# Patient Record
Sex: MALE | Race: WHITE
[De-identification: names, ages, dates, MRNs, and addresses within clinical notes are randomized per-mention and may not be internally consistent; named-entity substitution may affect disease eponyms.]

---

## 2020-03-13 ENCOUNTER — HOSPITAL ENCOUNTER (EMERGENCY)
Dept: HOSPITAL 47 - EC | Age: 30
Discharge: HOME | End: 2020-03-13
Payer: COMMERCIAL

## 2020-03-13 VITALS — SYSTOLIC BLOOD PRESSURE: 130 MMHG | DIASTOLIC BLOOD PRESSURE: 75 MMHG

## 2020-03-13 VITALS — HEART RATE: 76 BPM | RESPIRATION RATE: 16 BRPM

## 2020-03-13 VITALS — TEMPERATURE: 97.9 F

## 2020-03-13 DIAGNOSIS — Z91.048: ICD-10-CM

## 2020-03-13 DIAGNOSIS — Z87.891: ICD-10-CM

## 2020-03-13 DIAGNOSIS — Z88.3: ICD-10-CM

## 2020-03-13 DIAGNOSIS — K92.1: Primary | ICD-10-CM

## 2020-03-13 DIAGNOSIS — I10: ICD-10-CM

## 2020-03-13 DIAGNOSIS — R19.7: ICD-10-CM

## 2020-03-13 DIAGNOSIS — R10.30: ICD-10-CM

## 2020-03-13 DIAGNOSIS — M10.9: ICD-10-CM

## 2020-03-13 LAB
ALBUMIN SERPL-MCNC: 4.7 G/DL (ref 3.5–5)
ALP SERPL-CCNC: 80 U/L (ref 38–126)
ALT SERPL-CCNC: 38 U/L (ref 4–49)
ANION GAP SERPL CALC-SCNC: 10 MMOL/L
APTT BLD: 26.7 SEC (ref 22–30)
AST SERPL-CCNC: 30 U/L (ref 17–59)
BASOPHILS # BLD AUTO: 0.1 K/UL (ref 0–0.2)
BASOPHILS NFR BLD AUTO: 1 %
BUN SERPL-SCNC: 18 MG/DL (ref 9–20)
CALCIUM SPEC-MCNC: 9.6 MG/DL (ref 8.4–10.2)
CHLORIDE SERPL-SCNC: 106 MMOL/L (ref 98–107)
CO2 SERPL-SCNC: 24 MMOL/L (ref 22–30)
EOSINOPHIL # BLD AUTO: 0.1 K/UL (ref 0–0.7)
EOSINOPHIL NFR BLD AUTO: 2 %
ERYTHROCYTE [DISTWIDTH] IN BLOOD BY AUTOMATED COUNT: 5.31 M/UL (ref 4.3–5.9)
ERYTHROCYTE [DISTWIDTH] IN BLOOD: 12.2 % (ref 11.5–15.5)
GLUCOSE SERPL-MCNC: 102 MG/DL (ref 74–99)
HCT VFR BLD AUTO: 44.9 % (ref 39–53)
HGB BLD-MCNC: 15.3 GM/DL (ref 13–17.5)
INR PPP: 1 (ref ?–1.2)
LYMPHOCYTES # SPEC AUTO: 2.6 K/UL (ref 1–4.8)
LYMPHOCYTES NFR SPEC AUTO: 31 %
MCH RBC QN AUTO: 28.8 PG (ref 25–35)
MCHC RBC AUTO-ENTMCNC: 34.1 G/DL (ref 31–37)
MCV RBC AUTO: 84.5 FL (ref 80–100)
MONOCYTES # BLD AUTO: 0.5 K/UL (ref 0–1)
MONOCYTES NFR BLD AUTO: 5 %
NEUTROPHILS # BLD AUTO: 5 K/UL (ref 1.3–7.7)
NEUTROPHILS NFR BLD AUTO: 60 %
PLATELET # BLD AUTO: 247 K/UL (ref 150–450)
POTASSIUM SERPL-SCNC: 4.6 MMOL/L (ref 3.5–5.1)
PROT SERPL-MCNC: 7.6 G/DL (ref 6.3–8.2)
PT BLD: 10.4 SEC (ref 9–12)
SODIUM SERPL-SCNC: 140 MMOL/L (ref 137–145)
WBC # BLD AUTO: 8.4 K/UL (ref 3.8–10.6)

## 2020-03-13 PROCEDURE — 82272 OCCULT BLD FECES 1-3 TESTS: CPT

## 2020-03-13 PROCEDURE — 84484 ASSAY OF TROPONIN QUANT: CPT

## 2020-03-13 PROCEDURE — 99285 EMERGENCY DEPT VISIT HI MDM: CPT

## 2020-03-13 PROCEDURE — 86901 BLOOD TYPING SEROLOGIC RH(D): CPT

## 2020-03-13 PROCEDURE — 36415 COLL VENOUS BLD VENIPUNCTURE: CPT

## 2020-03-13 PROCEDURE — 96375 TX/PRO/DX INJ NEW DRUG ADDON: CPT

## 2020-03-13 PROCEDURE — 85730 THROMBOPLASTIN TIME PARTIAL: CPT

## 2020-03-13 PROCEDURE — 86850 RBC ANTIBODY SCREEN: CPT

## 2020-03-13 PROCEDURE — 96361 HYDRATE IV INFUSION ADD-ON: CPT

## 2020-03-13 PROCEDURE — 86900 BLOOD TYPING SEROLOGIC ABO: CPT

## 2020-03-13 PROCEDURE — 85025 COMPLETE CBC W/AUTO DIFF WBC: CPT

## 2020-03-13 PROCEDURE — 85610 PROTHROMBIN TIME: CPT

## 2020-03-13 PROCEDURE — 80053 COMPREHEN METABOLIC PANEL: CPT

## 2020-03-13 PROCEDURE — 96374 THER/PROPH/DIAG INJ IV PUSH: CPT

## 2020-03-13 NOTE — ED
GI Bleed HPI





- General


Chief complaint: GI Bleed


Stated complaint: patient states pooping blood


Time Seen by Provider: 03/13/20 08:42


Source: patient, RN notes reviewed, old records reviewed


Mode of arrival: ambulatory


Limitations: no limitations





- History of Present Illness


Initial comments: 





Patient is a 29-year-old male who presents emergency department today with 1 day

of bloody stool.  Patient reports that he had soft stools which then progressed 

to bloody stools and lower abdominal cramping and pain this morning.  Patient 

reports that he had some more bloody stools a work and decided to come home.  He

is here with his wife.  He's had a history of bowel trouble and frequent 

diarrhea.  Wife also states he's had a unintentional 10 pound weight loss over 

the past month.  Patient has had no history of colonoscopies before.  He denies 

any vomiting or nausea.  He states that he has no changes in urination.





- Related Data


                                Home Medications











 Medication  Instructions  Recorded  Confirmed


 


Ascorbic Acid [Vitamin C] 500 mg PO DAILY 11/13/19 11/15/19








                                  Previous Rx's











 Medication  Instructions  Recorded


 


Amoxic-Pot Clav 875-125Mg 1 tab PO Q12HR #20 tablet 03/13/20





[Augmentin 875-125]  











                                    Allergies











Allergy/AdvReac Type Severity Reaction Status Date / Time


 


iodine Allergy  Rash/Hives Verified 03/13/20 08:41


 


povidone-iodine Allergy  Rash/Hives Verified 03/13/20 08:41





[From Betadine]     


 


soap [From Betadine] Allergy  Rash/Hives Verified 03/13/20 08:41














Review of Systems


ROS Statement: 


Those systems with pertinent positive or pertinent negative responses have been 

documented in the HPI.





ROS Other: All systems not noted in ROS Statement are negative.





Past Medical History


Past Medical History: Hypertension


Additional Past Medical History / Comment(s): STATES NEW DIAGNOSIS OF HTN- NO 

MEDS- STATES HE IS TRYING DIET AND EXERCISE ., HX GOUT, ASTHMA (CHILD), LIPOMAS 

LEFT ARM. , SCHEDULING SLEEP APNEA TESTING, BACK & KNEE PAIN.


History of Any Multi-Drug Resistant Organisms: None Reported


Past Surgical History: No Surgical Hx Reported


Past Anesthesia/Blood Transfusion Reactions: Motion Sickness


Additional Past Anesthesia/Blood Transfusion Reaction / Comment(s): NO 

ANESTHESIA HX.


Past Psychological History: No Psychological Hx Reported


Smoking Status: Former smoker


Past Alcohol Use History: Occasional


Past Drug Use History: Marijuana





- Past Family History


  ** Mother


Family Medical History: No Reported History





General Exam





- General Exam Comments


Initial Comments: 





29-year-old male.  Alert and oriented.  No distress.


General: Well appearing, well nourished, in no distress. Oriented x 3, normal 

mood and affect . Ambulating without difficulty. 


Skin: Good turgor, no rash, unusual bruising or prominent lesions 


Hair: Normal texture and distribution. 


HEENT: Head: Normocephalic, atraumatic, no visible or palpable masses, 

depressions, or scaring.


 Eyes: Visual acuity intact, conjunctiva clear, sclera non-icteric, EOM intact, 

PERRL. 


Ears: EACs clear, TMs translucent & cone of light visualized. hearing intact. 


Nose: No external lesions, mucosa non-inflamed, septum and turbinates normal 


Mouth: Mucous membranes moist, no mucosal lesions. 


Teeth/Gums: No obvious caries or periodontal disease. No gingival inflammation 

or significant resorption. 


Pharynx: Mucosa non-inflamed, no tonsillar hypertrophy or exudate 


Neck: Supple, without lesions, bruits, or adenopathy, thyroid non-enlarged and 

non-tender 


Heart: No cardiomegaly or thrills; regular rate and rhythm, no murmur or gallop 


Lungs: Clear to auscultation and percussion 


Abdomen: Bowel sounds normal, left lower quadrant tenderness. 


Back: Spine normal without deformity or tenderness, no CVA tenderness 


Rectal: Normal sphincter tone, no hemorrhoids or masses palpable.  No 

significant amount of blood noted.  No significant tenderness.


Extremities: No amputations or deformities, cyanosis, edema or varicosities, 

peripheral pulses intact 


Musculoskeletal: Normal gait and station. No misalignment, asymmetry, crepitat

ion, defects, tenderness, masses, effusions, decreased range of motion, 

instability, atrophy or abnormal strength or tone in the head, neck, spine, 

ribs, pelvis or extremities. 


Neurologic: CN 2-12 normal. Sensation to pain, touch, and proprioception normal.

DTRs normal in upper and lower extremities. No pathologic reflexes. 


Psychiatric: Oriented X3, intact recent and remote memory, judgment and insight,

normal mood and affect.





Limitations: no limitations





Course


                                   Vital Signs











  03/13/20





  08:36


 


Temperature 97.9 F


 


Pulse Rate 76


 


Respiratory 16





Rate 


 


Blood Pressure 137/85


 


O2 Sat by Pulse 100





Oximetry 














Medical Decision Making





- Medical Decision Making





Patient is a 29-year-old male who presents is  emergency department today with 1

day of bloody stool.  Patient has some minimal left-sided lower abdominal pain. 

No acute tenderness.  This time patient's labwork was reviewed.  Hemoglobin is 

stable 15.  White blood cell count is 8.  Patient reports he has had 

intermittent diarrhea off and on for the past few years.  I discussed Patient 

may have autoimmune process like Crohn's or colitis.  Patient does have a 

positive occult stool.  I discussed case with Dr. Mcclellan.  With labs a single vial

signs stable discussed treatment this time for possible infectious colitis in 

the interim having a clear liquid diet.  He discussed the importance with the 

Patient following up with GI specialist and that he may need to have a 

colonoscopy.  Discussed strict return parameters of fever or vomiting or severe 

pain to return for reevaluation.  Patient is agreeable to treatment plan will 

comply.





- Lab Data


Result diagrams: 


                                 03/13/20 09:00





                                 03/13/20 09:00


                                   Lab Results











  03/13/20 03/13/20 03/13/20 Range/Units





  08:50 08:58 09:00 


 


WBC    8.4  (3.8-10.6)  k/uL


 


RBC    5.31  (4.30-5.90)  m/uL


 


Hgb    15.3  (13.0-17.5)  gm/dL


 


Hct    44.9  (39.0-53.0)  %


 


MCV    84.5  (80.0-100.0)  fL


 


MCH    28.8  (25.0-35.0)  pg


 


MCHC    34.1  (31.0-37.0)  g/dL


 


RDW    12.2  (11.5-15.5)  %


 


Plt Count    247  (150-450)  k/uL


 


Neutrophils %    60  %


 


Lymphocytes %    31  %


 


Monocytes %    5  %


 


Eosinophils %    2  %


 


Basophils %    1  %


 


Neutrophils #    5.0  (1.3-7.7)  k/uL


 


Lymphocytes #    2.6  (1.0-4.8)  k/uL


 


Monocytes #    0.5  (0-1.0)  k/uL


 


Eosinophils #    0.1  (0-0.7)  k/uL


 


Basophils #    0.1  (0-0.2)  k/uL


 


PT     (9.0-12.0)  sec


 


INR     (<1.2)  


 


APTT     (22.0-30.0)  sec


 


Sodium     (137-145)  mmol/L


 


Potassium     (3.5-5.1)  mmol/L


 


Chloride     ()  mmol/L


 


Carbon Dioxide     (22-30)  mmol/L


 


Anion Gap     mmol/L


 


BUN     (9-20)  mg/dL


 


Creatinine     (0.66-1.25)  mg/dL


 


Est GFR (CKD-EPI)AfAm     (>60 ml/min/1.73 sqM)  


 


Est GFR (CKD-EPI)NonAf     (>60 ml/min/1.73 sqM)  


 


Glucose     (74-99)  mg/dL


 


Calcium     (8.4-10.2)  mg/dL


 


Total Bilirubin     (0.2-1.3)  mg/dL


 


AST     (17-59)  U/L


 


ALT     (4-49)  U/L


 


Alkaline Phosphatase     ()  U/L


 


Troponin I     (0.000-0.034)  ng/mL


 


Total Protein     (6.3-8.2)  g/dL


 


Albumin     (3.5-5.0)  g/dL


 


Stool Occult Blood  Positive    (Negative)  


 


Blood Type     


 


Blood Type Confirm   O Positive   


 


Blood Type Recheck     


 


Bld Type Recheck Status     


 


Antibody Screen     


 


Spec Expiration Date     














  03/13/20 03/13/20 03/13/20 Range/Units





  09:00 09:00 09:00 


 


WBC     (3.8-10.6)  k/uL


 


RBC     (4.30-5.90)  m/uL


 


Hgb     (13.0-17.5)  gm/dL


 


Hct     (39.0-53.0)  %


 


MCV     (80.0-100.0)  fL


 


MCH     (25.0-35.0)  pg


 


MCHC     (31.0-37.0)  g/dL


 


RDW     (11.5-15.5)  %


 


Plt Count     (150-450)  k/uL


 


Neutrophils %     %


 


Lymphocytes %     %


 


Monocytes %     %


 


Eosinophils %     %


 


Basophils %     %


 


Neutrophils #     (1.3-7.7)  k/uL


 


Lymphocytes #     (1.0-4.8)  k/uL


 


Monocytes #     (0-1.0)  k/uL


 


Eosinophils #     (0-0.7)  k/uL


 


Basophils #     (0-0.2)  k/uL


 


PT  10.4    (9.0-12.0)  sec


 


INR  1.0    (<1.2)  


 


APTT  26.7    (22.0-30.0)  sec


 


Sodium   140   (137-145)  mmol/L


 


Potassium   4.6   (3.5-5.1)  mmol/L


 


Chloride   106   ()  mmol/L


 


Carbon Dioxide   24   (22-30)  mmol/L


 


Anion Gap   10   mmol/L


 


BUN   18   (9-20)  mg/dL


 


Creatinine   1.00   (0.66-1.25)  mg/dL


 


Est GFR (CKD-EPI)AfAm   >90   (>60 ml/min/1.73 sqM)  


 


Est GFR (CKD-EPI)NonAf   >90   (>60 ml/min/1.73 sqM)  


 


Glucose   102 H   (74-99)  mg/dL


 


Calcium   9.6   (8.4-10.2)  mg/dL


 


Total Bilirubin   1.0   (0.2-1.3)  mg/dL


 


AST   30   (17-59)  U/L


 


ALT   38   (4-49)  U/L


 


Alkaline Phosphatase   80   ()  U/L


 


Troponin I    <0.012  (0.000-0.034)  ng/mL


 


Total Protein   7.6   (6.3-8.2)  g/dL


 


Albumin   4.7   (3.5-5.0)  g/dL


 


Stool Occult Blood     (Negative)  


 


Blood Type     


 


Blood Type Confirm     


 


Blood Type Recheck     


 


Bld Type Recheck Status     


 


Antibody Screen     


 


Spec Expiration Date     














  03/13/20 Range/Units





  09:00 


 


WBC   (3.8-10.6)  k/uL


 


RBC   (4.30-5.90)  m/uL


 


Hgb   (13.0-17.5)  gm/dL


 


Hct   (39.0-53.0)  %


 


MCV   (80.0-100.0)  fL


 


MCH   (25.0-35.0)  pg


 


MCHC   (31.0-37.0)  g/dL


 


RDW   (11.5-15.5)  %


 


Plt Count   (150-450)  k/uL


 


Neutrophils %   %


 


Lymphocytes %   %


 


Monocytes %   %


 


Eosinophils %   %


 


Basophils %   %


 


Neutrophils #   (1.3-7.7)  k/uL


 


Lymphocytes #   (1.0-4.8)  k/uL


 


Monocytes #   (0-1.0)  k/uL


 


Eosinophils #   (0-0.7)  k/uL


 


Basophils #   (0-0.2)  k/uL


 


PT   (9.0-12.0)  sec


 


INR   (<1.2)  


 


APTT   (22.0-30.0)  sec


 


Sodium   (137-145)  mmol/L


 


Potassium   (3.5-5.1)  mmol/L


 


Chloride   ()  mmol/L


 


Carbon Dioxide   (22-30)  mmol/L


 


Anion Gap   mmol/L


 


BUN   (9-20)  mg/dL


 


Creatinine   (0.66-1.25)  mg/dL


 


Est GFR (CKD-EPI)AfAm   (>60 ml/min/1.73 sqM)  


 


Est GFR (CKD-EPI)NonAf   (>60 ml/min/1.73 sqM)  


 


Glucose   (74-99)  mg/dL


 


Calcium   (8.4-10.2)  mg/dL


 


Total Bilirubin   (0.2-1.3)  mg/dL


 


AST   (17-59)  U/L


 


ALT   (4-49)  U/L


 


Alkaline Phosphatase   ()  U/L


 


Troponin I   (0.000-0.034)  ng/mL


 


Total Protein   (6.3-8.2)  g/dL


 


Albumin   (3.5-5.0)  g/dL


 


Stool Occult Blood   (Negative)  


 


Blood Type  O Positive  


 


Blood Type Confirm   


 


Blood Type Recheck  No Previous Record  


 


Bld Type Recheck Status  CABO Indicated  


 


Antibody Screen  NEGATIVE  


 


Spec Expiration Date  03/16/2020 - 2300  














Disposition


Clinical Impression: 


 Bloody stool, Abdominal pain





Disposition: HOME SELF-CARE


Condition: Good


Instructions (If sedation given, give patient instructions):  Gastrointestinal B

wild (ED)


Additional Instructions: 


Patient should have a clear liquid diet for the next 24-48 hours.  Then advance 

to a bland diet afterwards.  Take the antibiotic as prescribed.  Follow-up with 

PCP.  Return to the ED if any alarming signs or symptoms occur.


Prescriptions: 


Amoxic-Pot Clav 875-125Mg [Augmentin 875-125] 1 tab PO Q12HR #20 tablet


Is patient prescribed a controlled substance at d/c from ED?: No


Referrals: 


HONORIO Rao III, MD [Primary Care Provider] - 1-2 days


Elo Reddy MD [STAFF PHYSICIAN] - 1-2 days


Time of Disposition: 10:24

## 2022-01-24 ENCOUNTER — HOSPITAL ENCOUNTER (OUTPATIENT)
Dept: HOSPITAL 47 - RADMRIMAIN | Age: 32
Discharge: HOME | End: 2022-01-24
Attending: NURSE PRACTITIONER
Payer: COMMERCIAL

## 2022-01-24 DIAGNOSIS — M71.21: ICD-10-CM

## 2022-01-24 DIAGNOSIS — Z00.00: Primary | ICD-10-CM

## 2022-01-24 DIAGNOSIS — M25.461: ICD-10-CM

## 2022-01-24 NOTE — MR
EXAMINATION TYPE: MR knee RT wo con

 

DATE OF EXAM: 1/24/2022

 

COMPARISON: None

 

HISTORY: PAIN IN RT KNEE

 

TECHNIQUE: Multiplanar, multisequence imaging of the right knee is performed without IV contrast.

 

FINDINGS: There is some motion artifact on the exam.

 

MEDIAL MENISCUS: Anterior and posterior horns are intact without tear, some mild increased signal pre
sent within the posterior horn of the medial meniscus noted.

 

LATERAL MENISCUS: Anterior and posterior horns are intact without tear.

 

CRUCIATE LIGAMENTS: The anterior and posterior cruciate ligaments are intact and there is some increa
sed signal present along the anterior cruciate ligament, some fluid signal is present, there may be a
 partial tear or strain.

 

COLLATERAL LIGAMENTS: The medial collateral ligament and lateral collateral ligament complex are inta
ct and unremarkable.

 

EXTENSOR MECHANISM: Visualized quadriceps and patellar tendons are intact.

 

EFFUSION:  Minimal effusion present posterior to the patella

 

POPLITEAL CYST:  Minimal semimembranosus gastrocnemius cyst is present measuring only 2.2 x 0.9 x 0.4
 cm

 

TRICOMPARTMENT SPACES: Maintained

 

CARTILAGE: Normal

 

BONE MARROW SIGNAL: Normal

 

OTHER:  No additional significant abnormality is appreciated.

 

IMPRESSION: 

Minimal joint effusion. Small Baker's cyst. Possible partial tear or strain anterior cruciate ligamen
t

## 2022-03-04 ENCOUNTER — HOSPITAL ENCOUNTER (EMERGENCY)
Dept: HOSPITAL 47 - EC | Age: 32
LOS: 1 days | Discharge: HOME | End: 2022-03-05
Payer: COMMERCIAL

## 2022-03-04 VITALS — TEMPERATURE: 98.7 F | RESPIRATION RATE: 18 BRPM

## 2022-03-04 DIAGNOSIS — Z91.041: ICD-10-CM

## 2022-03-04 DIAGNOSIS — M25.512: ICD-10-CM

## 2022-03-04 DIAGNOSIS — Z91.048: ICD-10-CM

## 2022-03-04 DIAGNOSIS — Z87.891: ICD-10-CM

## 2022-03-04 DIAGNOSIS — R07.89: Primary | ICD-10-CM

## 2022-03-04 DIAGNOSIS — I10: ICD-10-CM

## 2022-03-04 LAB
ALBUMIN SERPL-MCNC: 4.6 G/DL (ref 3.5–5)
ALP SERPL-CCNC: 101 U/L (ref 38–126)
ALT SERPL-CCNC: 41 U/L (ref 4–49)
ANION GAP SERPL CALC-SCNC: 11 MMOL/L
APTT BLD: 28.6 SEC (ref 22–30)
AST SERPL-CCNC: 36 U/L (ref 17–59)
BASOPHILS # BLD AUTO: 0.1 K/UL (ref 0–0.2)
BASOPHILS NFR BLD AUTO: 1 %
BUN SERPL-SCNC: 15 MG/DL (ref 9–20)
CALCIUM SPEC-MCNC: 9 MG/DL (ref 8.4–10.2)
CHLORIDE SERPL-SCNC: 104 MMOL/L (ref 98–107)
CO2 SERPL-SCNC: 23 MMOL/L (ref 22–30)
EOSINOPHIL # BLD AUTO: 0.2 K/UL (ref 0–0.7)
EOSINOPHIL NFR BLD AUTO: 2 %
ERYTHROCYTE [DISTWIDTH] IN BLOOD BY AUTOMATED COUNT: 5.42 M/UL (ref 4.3–5.9)
ERYTHROCYTE [DISTWIDTH] IN BLOOD: 13.4 % (ref 11.5–15.5)
GLUCOSE SERPL-MCNC: 115 MG/DL (ref 74–99)
HCT VFR BLD AUTO: 46.5 % (ref 39–53)
HGB BLD-MCNC: 16.5 GM/DL (ref 13–17.5)
INR PPP: 0.9 (ref ?–1.2)
LYMPHOCYTES # SPEC AUTO: 3.6 K/UL (ref 1–4.8)
LYMPHOCYTES NFR SPEC AUTO: 32 %
MAGNESIUM SPEC-SCNC: 2 MG/DL (ref 1.6–2.3)
MCH RBC QN AUTO: 30.5 PG (ref 25–35)
MCHC RBC AUTO-ENTMCNC: 35.5 G/DL (ref 31–37)
MCV RBC AUTO: 85.9 FL (ref 80–100)
MONOCYTES # BLD AUTO: 0.5 K/UL (ref 0–1)
MONOCYTES NFR BLD AUTO: 5 %
NEUTROPHILS # BLD AUTO: 6.7 K/UL (ref 1.3–7.7)
NEUTROPHILS NFR BLD AUTO: 59 %
PLATELET # BLD AUTO: 268 K/UL (ref 150–450)
POTASSIUM SERPL-SCNC: 4 MMOL/L (ref 3.5–5.1)
PROT SERPL-MCNC: 8.1 G/DL (ref 6.3–8.2)
PT BLD: 10.4 SEC (ref 9–12)
SODIUM SERPL-SCNC: 138 MMOL/L (ref 137–145)
WBC # BLD AUTO: 11.3 K/UL (ref 3.8–10.6)

## 2022-03-04 PROCEDURE — 85610 PROTHROMBIN TIME: CPT

## 2022-03-04 PROCEDURE — 84484 ASSAY OF TROPONIN QUANT: CPT

## 2022-03-04 PROCEDURE — 36415 COLL VENOUS BLD VENIPUNCTURE: CPT

## 2022-03-04 PROCEDURE — 71046 X-RAY EXAM CHEST 2 VIEWS: CPT

## 2022-03-04 PROCEDURE — 85025 COMPLETE CBC W/AUTO DIFF WBC: CPT

## 2022-03-04 PROCEDURE — 93005 ELECTROCARDIOGRAM TRACING: CPT

## 2022-03-04 PROCEDURE — 80053 COMPREHEN METABOLIC PANEL: CPT

## 2022-03-04 PROCEDURE — 83735 ASSAY OF MAGNESIUM: CPT

## 2022-03-04 PROCEDURE — 85730 THROMBOPLASTIN TIME PARTIAL: CPT

## 2022-03-04 NOTE — ED
General Adult HPI





- General


Chief complaint: Chest Pain


Stated complaint: Chest Pain, Lt Shoulder Pain


Time Seen by Provider: 03/04/22 22:16


Source: patient, RN notes reviewed


Mode of arrival: ambulatory


Limitations: no limitations





- History of Present Illness


Initial comments: 





31-year-old male presents to the emergency Department with complaints of left 

shoulder pain, left chest discomfort, and elevated heart rate.  Patient states 

he had an episode 2 days ago in which he experienced an elevated heart rate and 

left chest and shoulder pain.  Patient states his discomfort resolved shortly;  

no aggravating or alleviating factors.  States he had an another episode again 

today in which his left trapezius tightened up and became painful, followed by l

eft chest discomfort; he took an aspirin with relief.  Patient reports a resting

heart rate around 100 according to his Apple watch. He does take Lisinopril for 

hypertension. States he did drink a Mountain Dew at breakfast and does not 

usually consume caffeine. Denies any nicotine or stimulant drug use. No 

headache, fever, diaphoresis, dizziness, shortness of breath, difficulty 

breathing, abdominal pain, nausea, vomiting, diarrhea, or dysuria.





- Related Data


                                Home Medications











 Medication  Instructions  Recorded  Confirmed


 


Lisinopril [Zestril] 10 mg PO DAILY 03/04/22 03/04/22











                                    Allergies











Allergy/AdvReac Type Severity Reaction Status Date / Time


 


iodine Allergy  Rash/Hives Verified 03/04/22 22:57


 


povidone-iodine Allergy  Rash/Hives Verified 03/04/22 22:57





[From Betadine]     


 


soap [From Betadine] Allergy  Rash/Hives Verified 03/04/22 22:57














Review of Systems


ROS Statement: 


Those systems with pertinent positive or pertinent negative responses have been 

documented in the HPI.





ROS Other: All systems not noted in ROS Statement are negative.





Past Medical History


Past Medical History: Hypertension


Additional Past Medical History / Comment(s): STATES NEW DIAGNOSIS OF HTN- NO 

MEDS- STATES HE IS TRYING DIET AND EXERCISE ., HX GOUT, ASTHMA (CHILD), LIPOMAS 

LEFT ARM. , SCHEDULING SLEEP APNEA TESTING, BACK & KNEE PAIN.


History of Any Multi-Drug Resistant Organisms: None Reported


Past Surgical History: No Surgical Hx Reported


Past Anesthesia/Blood Transfusion Reactions: Motion Sickness


Additional Past Anesthesia/Blood Transfusion Reaction / Comment(s): NO 

ANESTHESIA HX.


Past Psychological History: No Psychological Hx Reported


Smoking Status: Former smoker


Past Alcohol Use History: Daily


Past Drug Use History: Marijuana





- Past Family History


  ** Mother


Family Medical History: No Reported History





General Exam


Limitations: no limitations (Well-developed, well-nourished male in no acute 

distress.  Initial temperature 98.3, pulse 100, respirations 20, blood pressure 

132/88, pulse ox 100% on room air.)


General appearance: alert, in no apparent distress


Eye exam: Present: normal appearance, PERRL, EOMI.  Absent: scleral icterus, 

conjunctival injection, periorbital swelling


Respiratory exam: Present: normal lung sounds bilaterally.  Absent: respiratory 

distress, wheezes, rales, rhonchi, stridor, chest wall tenderness


Cardiovascular Exam: Present: regular rate, normal rhythm, normal heart sounds. 

Absent: systolic murmur, diastolic murmur, rubs, gallop, clicks


GI/Abdominal exam: Present: soft, normal bowel sounds.  Absent: distended, 

tenderness, guarding, rebound, rigid


  ** Left


General: Present: normal inspection


Shoulder Exam: Present: normal inspection, full ROM.  Absent: tenderness, 

swelling


Vascular: Present: normal capillary refill, radial pulse.  Absent: vascular 

compromise, Pallo


Neurological exam: Present: alert, oriented X3, CN II-XII intact


Psychiatric exam: Present: normal affect, normal mood


Skin exam: Present: warm, dry, intact, normal color.  Absent: rash





Course


                                   Vital Signs











  03/04/22 03/04/22





  20:51 22:08


 


Temperature 98.3 F 98.7 F


 


Pulse Rate 100 96


 


Respiratory 20 18





Rate  


 


Blood Pressure 132/88 138/91


 


O2 Sat by Pulse 100 97





Oximetry  














Medical Decision Making





- Medical Decision Making





31-year-old male with a past medical history of hypertension presents to the 

emergency department for evaluation of left-sided chest and shoulder pain.  Upon

exam, patient is well-appearing and in no acute distress.  He is afebrile with 

stable vital signs.  Patient's heart rate currently ranges from  bpm, 

which is normal for patient.  Physical exam findings are unremarkable.  

Laboratory studies were reviewed and are within normal limits.  EKG shows normal

sinus rhythm with sinus arrhythmia.  Chest x-ray shows normal chest.  Patient is

pain-free and resting comfortably.  Discussed potential triggers for chest 

discomfort and elevated heart rate.  Suggested limiting her intake of caffeine 

and alcohol as well as minimizing time spent in hot tub.  Encouraged use of Motr

in for shoulder pain.  Instructed to follow up with PCP on Monday morning for 

recheck.  Strict return parameters were reviewed with patient and significant 

other.  Questions answered, they verbalize understanding and agree with this 

plan.





- Lab Data


Result diagrams: 


                                 03/04/22 21:08





                                 03/04/22 21:08


                                   Lab Results











  03/04/22 03/04/22 03/04/22 Range/Units





  21:08 21:08 21:08 


 


WBC  11.3 H    (3.8-10.6)  k/uL


 


RBC  5.42    (4.30-5.90)  m/uL


 


Hgb  16.5    (13.0-17.5)  gm/dL


 


Hct  46.5    (39.0-53.0)  %


 


MCV  85.9    (80.0-100.0)  fL


 


MCH  30.5    (25.0-35.0)  pg


 


MCHC  35.5    (31.0-37.0)  g/dL


 


RDW  13.4    (11.5-15.5)  %


 


Plt Count  268    (150-450)  k/uL


 


MPV  8.1    


 


Neutrophils %  59    %


 


Lymphocytes %  32    %


 


Monocytes %  5    %


 


Eosinophils %  2    %


 


Basophils %  1    %


 


Neutrophils #  6.7    (1.3-7.7)  k/uL


 


Lymphocytes #  3.6    (1.0-4.8)  k/uL


 


Monocytes #  0.5    (0-1.0)  k/uL


 


Eosinophils #  0.2    (0-0.7)  k/uL


 


Basophils #  0.1    (0-0.2)  k/uL


 


PT   10.4   (9.0-12.0)  sec


 


INR   0.9   (<1.2)  


 


APTT   28.6   (22.0-30.0)  sec


 


Sodium    138  (137-145)  mmol/L


 


Potassium    4.0  (3.5-5.1)  mmol/L


 


Chloride    104  ()  mmol/L


 


Carbon Dioxide    23  (22-30)  mmol/L


 


Anion Gap    11  mmol/L


 


BUN    15  (9-20)  mg/dL


 


Creatinine    1.01  (0.66-1.25)  mg/dL


 


Est GFR (CKD-EPI)AfAm    >90  (>60 ml/min/1.73 sqM)  


 


Est GFR (CKD-EPI)NonAf    >90  (>60 ml/min/1.73 sqM)  


 


Glucose    115 H  (74-99)  mg/dL


 


Calcium    9.0  (8.4-10.2)  mg/dL


 


Magnesium    2.0  (1.6-2.3)  mg/dL


 


Total Bilirubin    0.6  (0.2-1.3)  mg/dL


 


AST    36  (17-59)  U/L


 


ALT    41  (4-49)  U/L


 


Alkaline Phosphatase    101  ()  U/L


 


Troponin I     (0.000-0.034)  ng/mL


 


Total Protein    8.1  (6.3-8.2)  g/dL


 


Albumin    4.6  (3.5-5.0)  g/dL














  03/04/22 Range/Units





  21:08 


 


WBC   (3.8-10.6)  k/uL


 


RBC   (4.30-5.90)  m/uL


 


Hgb   (13.0-17.5)  gm/dL


 


Hct   (39.0-53.0)  %


 


MCV   (80.0-100.0)  fL


 


MCH   (25.0-35.0)  pg


 


MCHC   (31.0-37.0)  g/dL


 


RDW   (11.5-15.5)  %


 


Plt Count   (150-450)  k/uL


 


MPV   


 


Neutrophils %   %


 


Lymphocytes %   %


 


Monocytes %   %


 


Eosinophils %   %


 


Basophils %   %


 


Neutrophils #   (1.3-7.7)  k/uL


 


Lymphocytes #   (1.0-4.8)  k/uL


 


Monocytes #   (0-1.0)  k/uL


 


Eosinophils #   (0-0.7)  k/uL


 


Basophils #   (0-0.2)  k/uL


 


PT   (9.0-12.0)  sec


 


INR   (<1.2)  


 


APTT   (22.0-30.0)  sec


 


Sodium   (137-145)  mmol/L


 


Potassium   (3.5-5.1)  mmol/L


 


Chloride   ()  mmol/L


 


Carbon Dioxide   (22-30)  mmol/L


 


Anion Gap   mmol/L


 


BUN   (9-20)  mg/dL


 


Creatinine   (0.66-1.25)  mg/dL


 


Est GFR (CKD-EPI)AfAm   (>60 ml/min/1.73 sqM)  


 


Est GFR (CKD-EPI)NonAf   (>60 ml/min/1.73 sqM)  


 


Glucose   (74-99)  mg/dL


 


Calcium   (8.4-10.2)  mg/dL


 


Magnesium   (1.6-2.3)  mg/dL


 


Total Bilirubin   (0.2-1.3)  mg/dL


 


AST   (17-59)  U/L


 


ALT   (4-49)  U/L


 


Alkaline Phosphatase   ()  U/L


 


Troponin I  <0.012  (0.000-0.034)  ng/mL


 


Total Protein   (6.3-8.2)  g/dL


 


Albumin   (3.5-5.0)  g/dL














- EKG Data


EKG shows normal: sinus rhythm


Rate: normal


EKG Comments: 





EKG obtained at 2059 shows sinus rhythm with sinus arrhythmia and borderline 

right axis deviation along with nonspecific T-wave abnormality.  Ventricular 

rate 94, CA interval 154, QRS duration 87, QT//384.  Interpretation 

borderline ECG.





- Radiology Data


Radiology results: report reviewed, image reviewed


Two-view chest x-ray was obtained.  Report was reviewed in its entirety.  

Impression per Dr. Carrillo is normal chest.





Disposition


Clinical Impression: 


 Non-cardiac chest pain, Shoulder pain, left





Disposition: HOME SELF-CARE


Condition: Stable


Instructions (If sedation given, give patient instructions):  Chest Pain (ED), 

Heart Palpitations (ED)


Additional Instructions: 


Limit use of caffeine and alcohol.


Minimize time spent in hot tub.


May take Motrin if needed for shoulder pain.


Follow-up with your PCP on Monday morning for recheck.


Return to the emergency department with any shortness of breath, difficulty 

breathing, crushing chest pain, or other concerning symptoms.


Is patient prescribed a controlled substance at d/c from ED?: No


Referrals: 


HONORIO Rao III, MD [Primary Care Provider] - 1-2 days


Time of Disposition: 23:38

## 2022-03-04 NOTE — XR
EXAMINATION TYPE: XR chest 2V

 

DATE OF EXAM: 3/4/2022

 

COMPARISON: NONE

 

HISTORY: Chest pain

 

TECHNIQUE: 2 view

 

FINDINGS: Heart and mediastinum are normal. Lungs are clear. Diaphragm is normal. Bony thorax is inta
ct.

 

IMPRESSION: Normal chest.

## 2022-03-05 VITALS — HEART RATE: 74 BPM | DIASTOLIC BLOOD PRESSURE: 78 MMHG | SYSTOLIC BLOOD PRESSURE: 132 MMHG

## 2023-07-30 ENCOUNTER — HOSPITAL ENCOUNTER (EMERGENCY)
Dept: HOSPITAL 47 - EC | Age: 33
Discharge: HOME | End: 2023-07-30
Payer: COMMERCIAL

## 2023-07-30 VITALS
RESPIRATION RATE: 18 BRPM | SYSTOLIC BLOOD PRESSURE: 134 MMHG | HEART RATE: 77 BPM | DIASTOLIC BLOOD PRESSURE: 66 MMHG | TEMPERATURE: 98.6 F

## 2023-07-30 DIAGNOSIS — Z91.048: ICD-10-CM

## 2023-07-30 DIAGNOSIS — Z79.899: ICD-10-CM

## 2023-07-30 DIAGNOSIS — Z87.891: ICD-10-CM

## 2023-07-30 DIAGNOSIS — Z88.8: ICD-10-CM

## 2023-07-30 DIAGNOSIS — I10: ICD-10-CM

## 2023-07-30 DIAGNOSIS — F12.90: ICD-10-CM

## 2023-07-30 DIAGNOSIS — K52.9: Primary | ICD-10-CM

## 2023-07-30 LAB
ALBUMIN SERPL-MCNC: 4.6 G/DL (ref 3.5–5)
ALP SERPL-CCNC: 92 U/L (ref 38–126)
ALT SERPL-CCNC: 39 U/L (ref 4–49)
ANION GAP SERPL CALC-SCNC: 12 MMOL/L
AST SERPL-CCNC: 34 U/L (ref 17–59)
BASOPHILS # BLD AUTO: 0.1 K/UL (ref 0–0.2)
BASOPHILS NFR BLD AUTO: 1 %
BUN SERPL-SCNC: 16 MG/DL (ref 9–20)
CALCIUM SPEC-MCNC: 9.5 MG/DL (ref 8.4–10.2)
CHLORIDE SERPL-SCNC: 104 MMOL/L (ref 98–107)
CO2 SERPL-SCNC: 23 MMOL/L (ref 22–30)
EOSINOPHIL # BLD AUTO: 0.1 K/UL (ref 0–0.7)
EOSINOPHIL NFR BLD AUTO: 1 %
ERYTHROCYTE [DISTWIDTH] IN BLOOD BY AUTOMATED COUNT: 5.52 M/UL (ref 4.3–5.9)
ERYTHROCYTE [DISTWIDTH] IN BLOOD: 12.8 % (ref 11.5–15.5)
GLUCOSE SERPL-MCNC: 107 MG/DL (ref 74–99)
HCT VFR BLD AUTO: 46.4 % (ref 39–53)
HGB BLD-MCNC: 16.1 GM/DL (ref 13–17.5)
LIPASE SERPL-CCNC: 595 U/L (ref 23–300)
LYMPHOCYTES # SPEC AUTO: 2.3 K/UL (ref 1–4.8)
LYMPHOCYTES NFR SPEC AUTO: 21 %
MCH RBC QN AUTO: 29.2 PG (ref 25–35)
MCHC RBC AUTO-ENTMCNC: 34.8 G/DL (ref 31–37)
MCV RBC AUTO: 84 FL (ref 80–100)
MONOCYTES # BLD AUTO: 0.7 K/UL (ref 0–1)
MONOCYTES NFR BLD AUTO: 6 %
NEUTROPHILS # BLD AUTO: 7.5 K/UL (ref 1.3–7.7)
NEUTROPHILS NFR BLD AUTO: 70 %
PLATELET # BLD AUTO: 247 K/UL (ref 150–450)
POTASSIUM SERPL-SCNC: 4.1 MMOL/L (ref 3.5–5.1)
PROT SERPL-MCNC: 8 G/DL (ref 6.3–8.2)
SODIUM SERPL-SCNC: 139 MMOL/L (ref 137–145)
WBC # BLD AUTO: 10.6 K/UL (ref 3.8–10.6)

## 2023-07-30 PROCEDURE — 83605 ASSAY OF LACTIC ACID: CPT

## 2023-07-30 PROCEDURE — 96374 THER/PROPH/DIAG INJ IV PUSH: CPT

## 2023-07-30 PROCEDURE — 85025 COMPLETE CBC W/AUTO DIFF WBC: CPT

## 2023-07-30 PROCEDURE — 96375 TX/PRO/DX INJ NEW DRUG ADDON: CPT

## 2023-07-30 PROCEDURE — 99284 EMERGENCY DEPT VISIT MOD MDM: CPT

## 2023-07-30 PROCEDURE — 96361 HYDRATE IV INFUSION ADD-ON: CPT

## 2023-07-30 PROCEDURE — 36415 COLL VENOUS BLD VENIPUNCTURE: CPT

## 2023-07-30 PROCEDURE — 80053 COMPREHEN METABOLIC PANEL: CPT

## 2023-07-30 PROCEDURE — 83690 ASSAY OF LIPASE: CPT

## 2023-07-30 PROCEDURE — 74176 CT ABD & PELVIS W/O CONTRAST: CPT

## 2023-07-30 NOTE — ED
Abdominal Pain HPI





- General


Chief Complaint: Abdominal Pain


Stated Complaint: Abd Pain


Time Seen by Provider: 07/30/23 12:32


Source: patient, RN notes reviewed


Mode of arrival: ambulatory


Limitations: no limitations





- History of Present Illness


Initial Comments: 


32-year-old male presents emergency department tingling of abdominal pain.  

Patient states started a few days ago states is very intense when it first 

started states that he took his significant others stomach metal which helped 

states the pain is worsened he states he did have a bowel movement with some 

noted blood.  Patient denies any fever or chills slight nausea no vomiting 

states he has a large family history of colon issues including diverticulitis.








- Related Data


                                Home Medications











 Medication  Instructions  Recorded  Confirmed


 


lisinopriL [Zestril] 10 mg PO DAILY 03/04/22 03/04/22











                                    Allergies











Allergy/AdvReac Type Severity Reaction Status Date / Time


 


iodine Allergy  Rash/Hives Verified 07/30/23 12:21


 


povidone-iodine Allergy  Rash/Hives Verified 07/30/23 12:21





[From Betadine]     


 


soap [From Betadine] Allergy  Rash/Hives Verified 07/30/23 12:21














Review of Systems


ROS Statement: 


Those systems with pertinent positive or pertinent negative responses have been 

documented in the HPI.





ROS Other: All systems not noted in ROS Statement are negative.





Past Medical History


Past Medical History: Hypertension


Additional Past Medical History / Comment(s): STATES NEW DIAGNOSIS OF HTN- NO 

MEDS- STATES HE IS TRYING DIET AND EXERCISE ., HX GOUT, ASTHMA (CHILD), LIPOMAS 

LEFT ARM. , SCHEDULING SLEEP APNEA TESTING, BACK & KNEE PAIN.


History of Any Multi-Drug Resistant Organisms: None Reported


Past Surgical History: No Surgical Hx Reported


Past Anesthesia/Blood Transfusion Reactions: Motion Sickness


Additional Past Anesthesia/Blood Transfusion Reaction / Comment(s): NO 

ANESTHESIA HX.


Past Psychological History: No Psychological Hx Reported


Smoking Status: Former smoker


Past Alcohol Use History: Daily


Past Drug Use History: Marijuana





- Past Family History


  ** Mother


Family Medical History: No Reported History





General Exam


Limitations: no limitations


General appearance: alert, in no apparent distress


Head exam: Present: atraumatic, normocephalic, normal inspection


Eye exam: Present: normal appearance, PERRL, EOMI.  Absent: scleral icterus, 

conjunctival injection, periorbital swelling


Neck exam: Present: normal inspection.  Absent: tenderness, meningismus, 

lymphadenopathy


Respiratory exam: Present: normal lung sounds bilaterally.  Absent: respiratory 

distress, wheezes, rales, rhonchi, stridor


Cardiovascular Exam: Present: regular rate, normal rhythm, normal heart sounds. 

Absent: systolic murmur, diastolic murmur, rubs, gallop, clicks


GI/Abdominal exam: Present: soft, tenderness, normal bowel sounds.  Absent: 

distended, guarding, rebound, rigid





Course





                                   Vital Signs











  07/30/23





  12:17


 


Temperature 97.8 F


 


Pulse Rate 81


 


Respiratory 20





Rate 


 


Blood Pressure 148/89


 


O2 Sat by Pulse 100





Oximetry 














Medical Decision Making





- Medical Decision Making


Was pt. sent in by a medical professional or institution (, PA, NP, urgent 

care, hospital, or nursing home...) When possible be specific


@  -No


Did you speak to anyone other than the patient for history (EMS, parent, family,

police, friend...)? What history was obtained from this source 


@  -No


Did you review nursing and triage notes (agree or disagree)?  Why? 


@  -I reviewed and agree with nursing and triage notes


Were old charts reviewed (outside hosp., previous admission, EMS record, old 

EKG, old radiological studies, urgent care reports/EKG's, nursing home records)?

Report findings 


@  -No old charts were reviewed


Differential Diagnosis (chest pain, altered mental status, abdominal pain women,

abdominal pain men, vaginal bleeding, weakness, fever, dyspnea, syncope, 

headache, dizziness, GI bleed, back pain, seizure, CVA, palpatations, mental 

health, musculoskeletal)? 


@  -nDifferential Abdominal Pain Men:


Appendicitis, cholecystitis, diverticulosis, ischemic bowel, pancreatitis, 

hepatitis, UTI, gastroenteritis, AAA, incarcerated hernia, bowel obstruction, 

constipation, inflammatory bowel, hepatitis, peptic ulcer disease, splenic 

infarction, perforated viscus, testicular torsion, this is not meant to be an 

all-inclusive listable


EKG interpreted by me (3pts min.).


@  -None


X-rays interpreted by me (1pt min.).


@  -None done


CT interpreted by me (1pt min.).


@  -CT abdomen pelvis shows evidence of colitis


U/S interpreted by me (1pt. min.).


@  -None done


What testing was considered but not performed or refused? (CT, X-rays, U/S, 

labs)? Why?


@  -None


What meds were considered but not given or refused? Why?


@  -None


Did you discuss the management of the patient with other professionals (ghazal vega i.e. , PA, NP, lab, RT, psych nurse, , , teacher,

, )? Give summary


@  -No


Was smoking cessation discussed for >3mins.?


@  -No


Was critical care preformed (if so, how long)?


@  -No


Were there social determinants of health that impacted care today? How? 

(Homelessness, low income, unemployed, alcoholism, drug addiction, transportati

on, low edu. Level, literacy, decrease access to med. care, snf, rehab)?


@  -No


Was there de-escalation of care discussed even if they declined (Discuss DNR or 

withdrawal of care, Hospice)? DNR status


@  -No


What co-morbidities impacted this encounter? (DM, HTN, Smoking, COPD, CAD, 

Cancer, CVA, ARF, Chemo, Hep., AIDS, mental health diagnosis, sleep apnea, 

morbid obesity)?


@  -None


Was patient admitted / discharged? Hospital course, mention meds given and 

route, prescriptions, significant lab abnormalities, going to OR and other 

pertinent info.


@  -Discharge patient has mild colitis with no significant laboratory studies 

that were abnormal.  Patient mildly elevated lipase but no CT changes of 

pancreatitis.  Patient be discharged with supportive treatment and follow-up 

with GI. 


Undiagnosed new problem with uncertain prognosis?


@  -No


Drug Therapy requiring intensive monitoring for toxicity (Heparin, Nitro, 

Insulin, Cardizem)?


@  -No


Were any procedures done?


@  -No


Diagnosis/symptom?


@  -Colitis


Acute, or Chronic, or Acute on Chronic?


@  -Acute


Uncomplicated (without systemic symptoms) or Complicated (systemic symptoms)?


@  -Uncomplicated


Side effects of treatment?


@  -No


Exacerbation, Progression, or Severe Exacerbation?


@  -No


Poses a threat to life or bodily function? How? (Chest pain, USA, MI, pneumonia,

PE, COPD, DKA, ARF, appy, cholecystitis, CVA, Diverticulitis, Homicidal, 

Suicidal, threat to staff... and all critical care pts)


@  -No








- Lab Data


Result diagrams: 


                                 07/30/23 13:15





                                 07/30/23 13:15





                                   Lab Results











  07/30/23 07/30/23 07/30/23 Range/Units





  13:15 13:15 13:15 


 


WBC  10.6    (3.8-10.6)  k/uL


 


RBC  5.52    (4.30-5.90)  m/uL


 


Hgb  16.1    (13.0-17.5)  gm/dL


 


Hct  46.4    (39.0-53.0)  %


 


MCV  84.0    (80.0-100.0)  fL


 


MCH  29.2    (25.0-35.0)  pg


 


MCHC  34.8    (31.0-37.0)  g/dL


 


RDW  12.8    (11.5-15.5)  %


 


Plt Count  247    (150-450)  k/uL


 


MPV  8.6    


 


Neutrophils %  70    %


 


Lymphocytes %  21    %


 


Monocytes %  6    %


 


Eosinophils %  1    %


 


Basophils %  1    %


 


Neutrophils #  7.5    (1.3-7.7)  k/uL


 


Lymphocytes #  2.3    (1.0-4.8)  k/uL


 


Monocytes #  0.7    (0-1.0)  k/uL


 


Eosinophils #  0.1    (0-0.7)  k/uL


 


Basophils #  0.1    (0-0.2)  k/uL


 


Sodium   139   (137-145)  mmol/L


 


Potassium   4.1   (3.5-5.1)  mmol/L


 


Chloride   104   ()  mmol/L


 


Carbon Dioxide   23   (22-30)  mmol/L


 


Anion Gap   12   mmol/L


 


BUN   16   (9-20)  mg/dL


 


Creatinine   1.03   (0.66-1.25)  mg/dL


 


Est GFR (CKD-EPI)AfAm   >90   (>60 ml/min/1.73 sqM)  


 


Est GFR (CKD-EPI)NonAf   >90   (>60 ml/min/1.73 sqM)  


 


Glucose   107 H   (74-99)  mg/dL


 


Plasma Lactic Acid Davis    2.0  (0.7-2.0)  mmol/L


 


Calcium   9.5   (8.4-10.2)  mg/dL


 


Total Bilirubin   0.7   (0.2-1.3)  mg/dL


 


AST   34   (17-59)  U/L


 


ALT   39   (4-49)  U/L


 


Alkaline Phosphatase   92   ()  U/L


 


Total Protein   8.0   (6.3-8.2)  g/dL


 


Albumin   4.6   (3.5-5.0)  g/dL


 


Lipase   595 H   ()  U/L














Disposition


Clinical Impression: 


 Colitis





Disposition: HOME SELF-CARE


Condition: Stable


Instructions (If sedation given, give patient instructions):  Colitis (ED)


Additional Instructions: 


Please return to the Emergency Department if symptoms worsen or any other concer

ns.


Is patient prescribed a controlled substance at d/c from ED?: No


Referrals: 


HONORIO Rao III, MD [Primary Care Provider] - 1-2 days


Elo Reddy MD [STAFF PHYSICIAN] - 1-2 days


Time of Disposition: 14:56

## 2023-07-30 NOTE — CT
EXAMINATION TYPE: CT abdomen pelvis wo con

CT DLP: 938.4 mGycm, Automated exposure control for dose reduction was used.

 

DATE OF EXAM: 7/30/2023 1:27 PM

 

COMPARISON: None. 

 

CLINICAL INDICATION:Male, 32 years old with history of abdominal pain; left flank pain

 

TECHNIQUE:  Axial CT of the abdomen and pelvis. Sagittal and coronal reformats were created on a [employer] workstation. 

 

Contrast used: mL of , (none if empty)

Oral contrast used: without Oral Contrast (none if empty)

 

FINDINGS: 

LOWER CHEST: Unremarkable

 

ABDOMEN

LIVER: Unremarkable

GALLBLADDER AND BILE DUCTS: Unremarkable.

PANCREAS: Unremarkable.

SPLEEN: Unremarkable.

ADRENAL GLANDS: Unremarkable.

KIDNEYS AND URETERS: No evidence of hydronephrosis or renal calculus. The ureters are unremarkable.  


 

PELVIS

BLADDER: Unremarkable

REPRODUCTIVE: Unremarkable.

 

ABDOMEN & PELVIS

STOMACH AND BOWEL: No evidence of bowel obstruction. Circumferential wall thickening of the sigmoid c
olon. Mild inflammation changes. The appendix is normal.

PERITONEUM/RETROPERITONEUM: No evidence of pneumoperitoneum or free fluid.

VASCULATURE: No evidence of aortic aneurysm. 

MUSCULOSKELETAL: No acute osseous abnormalities

LYMPH NODES: No gross evidence for lymphadenopathy.

SOFT TISSUE/ABDOMINAL WALL: Fat-containing umbilical hernia. Fat-containing left inguinal hernia.

 

IMPRESSION:

Mild sigmoid colitis. No evidence for obstructive uropathy or calculus.